# Patient Record
Sex: MALE | Race: WHITE | NOT HISPANIC OR LATINO | URBAN - NONMETROPOLITAN AREA
[De-identification: names, ages, dates, MRNs, and addresses within clinical notes are randomized per-mention and may not be internally consistent; named-entity substitution may affect disease eponyms.]

---

## 2019-01-25 ENCOUNTER — IMPORTED ENCOUNTER (OUTPATIENT)
Dept: URBAN - NONMETROPOLITAN AREA CLINIC 1 | Facility: CLINIC | Age: 79
End: 2019-01-25

## 2019-01-25 PROBLEM — H35.3132: Noted: 2019-01-25

## 2019-01-25 PROBLEM — H43.812: Noted: 2019-01-25

## 2019-01-25 PROCEDURE — 92014 COMPRE OPH EXAM EST PT 1/>: CPT

## 2019-01-25 NOTE — PATIENT DISCUSSION
AMD - dryOD / WET OS TODAY-Explained dry AMD and advised that there are no treatments available at this time.-Continue AREDS 2 MVT. -Continue Amsler grid monitoring daily. Pt is to contact our office if any changes are noted. KEEP APPT FEB 14TH WITH DR. SANTANA FOR F/U

## 2019-06-10 ENCOUNTER — IMPORTED ENCOUNTER (OUTPATIENT)
Dept: URBAN - NONMETROPOLITAN AREA CLINIC 1 | Facility: CLINIC | Age: 79
End: 2019-06-10

## 2019-06-10 PROBLEM — H35.3113: Noted: 2019-06-10

## 2019-06-10 PROBLEM — E11.9: Noted: 2021-03-01

## 2019-06-10 PROBLEM — H35.3221: Noted: 2019-06-10

## 2019-06-10 PROBLEM — Z96.1: Noted: 2019-06-10

## 2019-06-10 PROBLEM — H43.812: Noted: 2021-03-01

## 2019-06-10 PROBLEM — H43.812: Noted: 2019-06-10

## 2019-06-10 PROCEDURE — 92014 COMPRE OPH EXAM EST PT 1/>: CPT

## 2019-06-10 NOTE — PATIENT DISCUSSION
AMD - dryOD / WET OS -Continue AREDS 2 MVT. -Continue Amsler grid monitoring daily. Pt is to contact our office if any changes are noted. KEEP APPTS WITH DR. SANTANA FOR F/U.

## 2021-03-01 ENCOUNTER — IMPORTED ENCOUNTER (OUTPATIENT)
Dept: URBAN - NONMETROPOLITAN AREA CLINIC 1 | Facility: CLINIC | Age: 81
End: 2021-03-01

## 2021-03-01 PROCEDURE — 92014 COMPRE OPH EXAM EST PT 1/>: CPT

## 2021-03-01 NOTE — PATIENT DISCUSSION
AMD - dryOD / WET OS -Continue AREDS 2 MVT. -Continue Amsler grid monitoring daily. Pt is to contact our office if any changes are noted. KEEP APPTS WITH DR. SANTANA FOR F/U.s/p PCIOL-Stable PCIOL OU.-Monitor for PCO. DM s DR-Stressed the importance of keeping blood sugars under control blood pressure under control and weight normalization and regular visits with PCP. -Explained the possible effects of poorly controlled diabetes and the damage that diabetes can cause to ocular health. -Patient to check HgbA1C.-Pt instructed to contact our office with any vision changes.; Dr's Notes: PCP Agnieszka Almonte

## 2022-04-09 ASSESSMENT — VISUAL ACUITY
OS_SC: CF2'
OD_SC: 20/25
OS_SC: CF4'
OD_SC: 20/30
OS_SC: 20/400
OU_CC: 20/30
OD_SC: 20/30

## 2022-04-09 ASSESSMENT — TONOMETRY
OD_IOP_MMHG: 19
OS_IOP_MMHG: 15
OS_IOP_MMHG: 17
OD_IOP_MMHG: 17
OD_IOP_MMHG: 17
OS_IOP_MMHG: 17

## 2023-04-04 ENCOUNTER — COMPREHENSIVE EXAM (OUTPATIENT)
Dept: RURAL CLINIC 1 | Facility: CLINIC | Age: 83
End: 2023-04-04

## 2023-04-04 DIAGNOSIS — H35.3113: ICD-10-CM

## 2023-04-04 DIAGNOSIS — E11.9: ICD-10-CM

## 2023-04-04 DIAGNOSIS — H43.812: ICD-10-CM

## 2023-04-04 DIAGNOSIS — H35.3221: ICD-10-CM

## 2023-04-04 DIAGNOSIS — Z96.1: ICD-10-CM

## 2023-04-04 PROCEDURE — 92014 COMPRE OPH EXAM EST PT 1/>: CPT

## 2023-04-04 PROCEDURE — 92015 DETERMINE REFRACTIVE STATE: CPT

## 2023-04-04 ASSESSMENT — VISUAL ACUITY
OS_CC: 20/400
OU_CC: 20/100
OD_CC: 20/60-2
OD_PH: 20/60
OS_PH: 20/400

## 2024-04-05 ENCOUNTER — COMPREHENSIVE EXAM (OUTPATIENT)
Dept: RURAL CLINIC 1 | Facility: CLINIC | Age: 84
End: 2024-04-05

## 2024-04-05 DIAGNOSIS — H43.812: ICD-10-CM

## 2024-04-05 DIAGNOSIS — H35.3221: ICD-10-CM

## 2024-04-05 DIAGNOSIS — Z96.1: ICD-10-CM

## 2024-04-05 DIAGNOSIS — E11.9: ICD-10-CM

## 2024-04-05 DIAGNOSIS — H35.3113: ICD-10-CM

## 2024-04-05 PROCEDURE — 92014 COMPRE OPH EXAM EST PT 1/>: CPT

## 2024-04-05 PROCEDURE — 92015 DETERMINE REFRACTIVE STATE: CPT

## 2024-04-05 ASSESSMENT — VISUAL ACUITY
OD_CC: 20/50
OS_CC: 20/400

## 2024-04-05 ASSESSMENT — TONOMETRY
OS_IOP_MMHG: 12
OD_IOP_MMHG: 12